# Patient Record
Sex: FEMALE | URBAN - METROPOLITAN AREA
[De-identification: names, ages, dates, MRNs, and addresses within clinical notes are randomized per-mention and may not be internally consistent; named-entity substitution may affect disease eponyms.]

---

## 2017-02-02 ENCOUNTER — OFFICE VISIT (OUTPATIENT)
Dept: FAMILY MEDICINE | Facility: CLINIC | Age: 27
End: 2017-02-02

## 2017-02-02 VITALS
SYSTOLIC BLOOD PRESSURE: 113 MMHG | HEIGHT: 64 IN | BODY MASS INDEX: 39.67 KG/M2 | OXYGEN SATURATION: 97 % | WEIGHT: 232.38 LBS | TEMPERATURE: 98.3 F | HEART RATE: 81 BPM | DIASTOLIC BLOOD PRESSURE: 76 MMHG

## 2017-02-02 DIAGNOSIS — R07.0 THROAT PAIN: Primary | ICD-10-CM

## 2017-02-02 LAB — S PYO AG THROAT QL IA.RAPID: NEGATIVE

## 2017-02-02 NOTE — Clinical Note
February 7, 2017        Abida Fam  [address] [place] [place] [address]        Dear Abida,    The confirmatory test for strep was negative which means you do not have a strep infection that needs antibiotics. If you are not getting better or have any increased symptoms, you can return to clinic to have retesting done, otherwise, your throat pain will likely go away with supportive cares for sore throat. Please call the clinic if you have any questions.     Please see below for your test results.    Resulted Orders   Rapid Strep Screen (Group) ([facility])   Result Value Ref Range    Rapid Strep A Screen NEGATIVE Negative   Group A Strep Throat (Health system)   Result Value Ref Range    Group A Strep,Throat No Group A Strep rRNA detected No Group A Strep rRNA detected    Narrative    Test performed by:  [facility] [address] [place] [address]  Intended Use:  The GEN-PROBE Group A Streptococcus direct test is a DNA probe assay which   uses nucleic acid hybridization for the qualitative detection of Group A   Streptococcal RNA to aid in the diagnosis of Group A Streptococcal pharyngitis   from throat swabs.  Methodology:  The GEN-PROBE DNA probe assay uses a single-stranded DNA probe with a   chemiluminescent label, which is complementary to the ribosomal RNA of the   target organism.  The labeled DNA probe combines with the ribosomal RNA to   form a stable DNA:RNA hybrid.  The labeled DNA:RNA hybrids are measured in   GEN-PROBE luminometer.  A positive result is a luminometer reading greater   than or equal to the cut-off.  A value below this cut-off is a negative   result.     If you have any questions, please call the clinic to make an appointment.    Sincerely,  Anai Diallo MD

## 2017-02-02 NOTE — PROGRESS NOTES
"       SUBJECTIVE       Abida Fam is a 26 year old  female with a PMH significant for:     Patient Active Problem List   Diagnosis     Abnormal pap smear of cervix     Acne     Health Care Home     Morbid obesity (H)     Vitamin D deficiency     Postpartum hemorrhage, delivered     She presents for 1 day history of sore throat and diarrhea and is concerned she might have strep infection. Her daughter was diagnosed with strep pharyngitis last night at the hospital with positive strep culture and has started treatment. Her other 2 daughters have not had symptoms. Sore throat started yesterday evening with pain and mild difficulty swallowing. She woke up this morning with more pain and \"closed feeling\" in her throat. She has not had difficulty breathing. She has not noticed swelling on the outside of her neck. She felt a little warm last night when she got back from the hospital at 1 AM, but did not take her temperature and is unsure if she has had true fevers. She has not had sinus pressure or rhinorrhea, no changes in hearing or pain in ears. No cough, no headaches or chest pain. Has not had abdominal pain but had a few episodes of diarrhea since last night and this morning. Has not recently had strep infection and not recently taking antibiotics. Denies allergies to medications and does not take any medications on a regular basis.     PMH, Medications and Allergies were reviewed and updated as needed.          OBJECTIVE     Filed Vitals:    02/02/17 0835   BP: 113/76   Pulse: 81   Temp: 98.3  F (36.8  C)   TempSrc: Oral   Height: 5' 3.75\" (161.9 cm)   Weight: 232 lb 6 oz (105.405 kg)   SpO2: 97%     Body mass index is 40.21 kg/(m^2).    General: Calm appearing female in no apparent distress, obese  HEENT: normocephalic, atraumatic, PERRL, sclera anicteric and not injected. Tonsils pink and 1+ without exudate or erythema. No cervical lymphadenopathy.   Cardio: RRR, no murmurs, rubs or gallop  Respiratory: Clear " breath sounds bilaterally without wheezes or rhonchi  Abdomen: Soft, non-tender    Results for orders placed or performed in visit on 02/02/17 (from the past 24 hour(s))   Rapid Strep Screen (Group) ([facility])   Result Value Ref Range    Rapid Strep A Screen NEGATIVE Negative       ASSESSMENT AND PLAN     1. Throat pain: Exposure to strep infection in her daughter this week. Negative strep test today and mild symptoms. Exam findings not significant for tonsillar swelling or exudates. Recommend supportive cares with tylenol and/or ibuprofen prn for pain. Discussed treatment options if confirmatory test positive, will sent penicillin 500 mg BID x10 days to her pharmacy.   - Rapid Strep Screen (Group) ([facility])      Anai Diallo MD - PGY-3  [facility] Medicine Residency    Patient seen and plan of care discussed with preceptor, Dr. Gomez

## 2017-02-02 NOTE — PROGRESS NOTES
Preceptor attestation:  Patient seen and discussed with the resident.  Assessment and plan reviewed with resident and agreed upon.  Supervising physician: Jennifer Gomez  [facility]

## 2017-02-03 LAB — GROUP A STREP,THROAT: NORMAL

## 2017-02-28 ENCOUNTER — OFFICE VISIT (OUTPATIENT)
Dept: FAMILY MEDICINE | Facility: CLINIC | Age: 27
End: 2017-02-28

## 2017-02-28 VITALS
HEART RATE: 90 BPM | SYSTOLIC BLOOD PRESSURE: 131 MMHG | HEIGHT: 63 IN | BODY MASS INDEX: 41.89 KG/M2 | TEMPERATURE: 97.9 F | DIASTOLIC BLOOD PRESSURE: 81 MMHG | WEIGHT: 236.4 LBS

## 2017-02-28 DIAGNOSIS — Z30.9 ENCOUNTER FOR CONTRACEPTIVE MANAGEMENT, UNSPECIFIED CONTRACEPTIVE ENCOUNTER TYPE: Primary | ICD-10-CM

## 2017-02-28 LAB — HCG UR QL: NEGATIVE

## 2017-02-28 NOTE — MR AVS SNAPSHOT
"              After Visit Summary   2017    Abida Fam    MRN: [id number]           Patient Information     Date Of Birth          1990        Visit Information        Provider Department      2017 3:30 PM Vira Britt MD [facility]        Today's Diagnoses     Encounter for contraceptive management, unspecified contraceptive encounter type    -  1       Follow-ups after your visit        Who to contact     Please call your clinic at [phone] to:    Ask questions about your health    Make or cancel appointments    Discuss your medicines    Learn about your test results    Speak to your doctor   If you have compliments or concerns about an experience at your clinic, or if you wish to file a complaint, please contact [facility] Patient Relations at [phone] or email us at [email or web address]         Additional Information About Your Visit        MyChart Information     PowerFile is an electronic gateway that provides easy, online access to your medical records. With PowerFile, you can request a clinic appointment, read your test results, renew a prescription or communicate with your care team.     To sign up for Jumbletst visit the website at [email or web address]   You will be asked to enter the access code listed below, as well as some personal information. Please follow the directions to create your username and password.     Your access code is: [email or web address]  Expires: 2017  3:35 PM     Your access code will  in 90 days. If you need help or a new code, please contact your [facility] or call [phone] for assistance.        Care EveryWhere ID     This is your Care EveryWhere ID. This could be used by other organizations to access your [place] medical records  [id number]        Your Vitals Were     Pulse Temperature Height Last Period BMI (Body Mass Index)       90 97.9  F (36.6  C) (Oral) 5' 3.39\" (161 " cm) 02/16/2017 41.37 kg/m2        Blood Pressure from Last 3 Encounters:   02/28/17 131/81   02/02/17 113/76   07/28/16 105/66    Weight from Last 3 Encounters:   02/28/17 236 lb 6.4 oz (107.2 kg)   02/02/17 232 lb 6 oz (105.4 kg)   07/28/16 240 lb 6.4 oz (109 kg)              We Performed the Following     HCG Qualitative Urine (UPT)  ([facility])        Primary Care Provider Office Phone # Fax #    Vira Britt [phone]       [address] [address]        Thank you!     Thank you for choosing [facility]  for your care. Our goal is always to provide you with excellent care. Hearing back from our patients is one way we can continue to improve our services. Please take a few minutes to complete the written survey that you may receive in the mail after your visit with us. Thank you!             Your Updated Medication List - Protect others around you: Learn how to safely use, store and throw away your medicines at www.disposemymeds.org.      Notice  As of 2/28/2017  3:35 PM    You have not been prescribed any medications.

## 2017-02-28 NOTE — PROGRESS NOTES
"       SUBJECTIVE       Abida Fam is a 26 year old  female with a PMH significant for:     Patient Active Problem List   Diagnosis     Abnormal pap smear of cervix     Acne     Health Care Home     Morbid obesity (H)     Vitamin D deficiency     Postpartum hemorrhage, delivered     She presents with birth control.  She has not taken the OCPs in 3 months.  Her LMP was Feb 16th - it finished one week ago.  Her last intercourse was one week ago.  She has reliably used condoms.  She has had a mirena in the past but believes it makes her gain weight and song.  For this reason she is interested in a 10 year non hormone IUD - copper.  Discussed risks and benefits and concerns for heavy bleeding and spotting.  She is okay with these.     Pregnancy test today was negative.       PMH, Medications and Allergies were reviewed and updated as needed.        REVIEW OF SYSTEMS     Pertinent review, per HPI.        OBJECTIVE     Vitals:    02/28/17 1512   BP: 131/81   BP Location: Left arm   Patient Position: Chair   Pulse: 90   Temp: 97.9  F (36.6  C)   TempSrc: Oral   Weight: 236 lb 6.4 oz (107.2 kg)   Height: 5' 3.39\" (161 cm)     Body mass index is 41.37 kg/(m^2).    Constitutional: Well appearing, no acute distress.  No respiratory distress  Abdomen: Morbidly obese.     Results for orders placed or performed in visit on 02/28/17 (from the past 24 hour(s))   HCG Qualitative Urine (UPT)  ([facility])   Result Value Ref Range    HCG Qual Urine NEGATIVE Negative           ASSESSMENT AND PLAN     (Z30.9) Encounter for contraceptive management, unspecified contraceptive encounter type  (primary encounter diagnosis)  Comment: Risks and benefits of IUD discussed, wishes to proceed with copper IUD for hormone avoidance.  She was instructed to RTC in two weeks and use condoms reliably, which she agrees to.    Plan: HCG Qualitative Urine (UPT) NEGATIVE    RTC 2wks or sooner if develops new or worsening symptoms.      Vira Britt MD  PGY-3 " [facility]

## 2017-02-28 NOTE — PROGRESS NOTES
Preceptor attestation:  Patient seen and discussed with the resident. Assessment and plan reviewed with resident and agreed upon.  Supervising physician: Flex Guardado  [facility]

## 2017-03-30 ENCOUNTER — OFFICE VISIT (OUTPATIENT)
Dept: FAMILY MEDICINE | Facility: CLINIC | Age: 27
End: 2017-03-30

## 2017-03-30 VITALS
DIASTOLIC BLOOD PRESSURE: 79 MMHG | HEART RATE: 80 BPM | OXYGEN SATURATION: 98 % | TEMPERATURE: 98.6 F | WEIGHT: 238 LBS | HEIGHT: 65 IN | SYSTOLIC BLOOD PRESSURE: 116 MMHG | BODY MASS INDEX: 39.65 KG/M2

## 2017-03-30 DIAGNOSIS — Z30.430 ENCOUNTER FOR INSERTION OF INTRAUTERINE CONTRACEPTIVE DEVICE: ICD-10-CM

## 2017-03-30 DIAGNOSIS — Z30.014 ENCOUNTER FOR INITIAL PRESCRIPTION OF INTRAUTERINE CONTRACEPTIVE DEVICE: Primary | ICD-10-CM

## 2017-03-30 LAB — HCG UR QL: NEGATIVE

## 2017-03-30 RX ORDER — COPPER 313.4 MG/1
1 INTRAUTERINE DEVICE INTRAUTERINE CONTINUOUS
Start: 2017-03-30

## 2017-03-30 NOTE — MR AVS SNAPSHOT
After Visit Summary   3/30/2017    Abida Fam    MRN: [id number]           Patient Information     Date Of Birth          1990        Visit Information        Provider Department      3/30/2017 1:10 PM Vira Britt MD [facility]        Today's Diagnoses     Encounter for initial prescription of intrauterine contraceptive device    -  1    Encounter for insertion of intrauterine contraceptive device           Follow-ups after your visit        Who to contact     Please call your clinic at [phone] to:    Ask questions about your health    Make or cancel appointments    Discuss your medicines    Learn about your test results    Speak to your doctor   If you have compliments or concerns about an experience at your clinic, or if you wish to file a complaint, please contact [facility] Patient Relations at [phone] or email us at [email or web address]         Additional Information About Your Visit        MyChart Information     Equip Outdoor Technologiest is an electronic gateway that provides easy, online access to your medical records. With iMedX, you can request a clinic appointment, read your test results, renew a prescription or communicate with your care team.     To sign up for Equip Outdoor Technologiest visit the website at [email or web address]   You will be asked to enter the access code listed below, as well as some personal information. Please follow the directions to create your username and password.     Your access code is: [email or web address]  Expires: 2017  4:35 PM     Your access code will  in 90 days. If you need help or a new code, please contact your [facility] or call [phone] for assistance.        Care EveryWhere ID     This is your Care EveryWhere ID. This could be used by other organizations to access your [place] medical records  [id number]        Your Vitals Were     Pulse Temperature Height Last Period Pulse  "Oximetry Breastfeeding?    80 98.6  F (37  C) 5' 4.5\" (163.8 cm) 03/17/2017 (Exact Date) 98% No    BMI (Body Mass Index)                   40.22 kg/m2            Blood Pressure from Last 3 Encounters:   03/30/17 116/79   02/28/17 131/81   02/02/17 113/76    Weight from Last 3 Encounters:   03/30/17 238 lb (108 kg)   02/28/17 236 lb 6.4 oz (107.2 kg)   02/02/17 232 lb 6 oz (105.4 kg)              We Performed the Following     HCG Qualitative Urine ([facility])     INSERTION INTRAUTERINE DEVICE     INTRAUT COPPER CONTRACEPTIVE          Today's Medication Changes          These changes are accurate as of: 3/30/17 11:59 PM.  If you have any questions, ask your nurse or doctor.               Start taking these medicines.        Dose/Directions    paragard intrauterine copper   Used for:  Encounter for initial prescription of intrauterine contraceptive device   Started by:  Vira Britt MD        Dose:  1 each   1 each by Intrauterine route continuous   Refills:  0            Where to get your medicines      Some of these will need a paper prescription and others can be bought over the counter.  Ask your nurse if you have questions.     You don't need a prescription for these medications     paragard intrauterine copper                Primary Care Provider Office Phone # Fax #    Vira Britt [phone]       [address] [address]        Thank you!     Thank you for choosing [facility]  for your care. Our goal is always to provide you with excellent care. Hearing back from our patients is one way we can continue to improve our services. Please take a few minutes to complete the written survey that you may receive in the mail after your visit with us. Thank you!             Your Updated Medication List - Protect others around you: Learn how to safely use, store and throw away your medicines at www.disposemymeds.org.          This list is accurate as of: 3/30/17 11:59 PM.  Always " use your most recent med list.                   Brand Name Dispense Instructions for use    paragard intrauterine copper      1 each by Intrauterine route continuous

## 2017-03-30 NOTE — PROGRESS NOTES
IUD Insertion Note    Abida Fam is a patient of Vira Sood here for IUD placement.   Indication: birth control     Counselling: Discussed potential side effects of Paragard, including increased bleeding and cramping.  Patient aware to check for strings every month.    Consent: Affirmation of informed consent was signed and scanned into the medical record. Risks, benefits and alternatives were discussed including small risk of uterine perforation during insertion. Patient's questions were elicited and answered.   Procedure safety checklist was completed:  Yes  Time Out (Pause for the Cause) completed: Yes    Labs: UPT negative    Patient chooses this IUD type: ParaGard    Technique:   Patient was premedicated with ibuprofen:   Yes  Uterine position was confirmed by bimanual exam:  YES  Using a sterile speculum and equipment, the cervix  was examined.   A GC-Chlamydia probe was collected:   No   The cervix was cleansed with Betadine prep. Tenaculum was applied to cervix with tension to straighten cervical canal. The uterus was sounded to 7.5 cm. The ParaGard insertion apparatus was prepared and introduced into the cervix without significant resistance.  The IUD was placed in the uterus. The strings were trimmed 2 cm below the cervix.   IUD Lot #: [id number]  EBL: minimal  Complications: No  Tolerance: Pt tolerated procedure well and was in stable condition.     Follow up: Pt was instructed to call if heavy bleeding, severe cramping or foul smelling discharge. May take 400-600 mg ibuprofen TID prn for mild cramping. Pt should return in one month for IUD string check. Pt instructed she requires a new IUD device in 10 years.     Resident: Vira Britt  Faculty: Celos present for and supervised this entire procedure.

## 2017-03-31 NOTE — PROGRESS NOTES
"Preceptor attestation:  Vital signs reviewed: /79  Pulse 80  Temp 98.6  F (37  C)  Ht 5' 4.5\" (163.8 cm)  Wt 238 lb (108 kg)  LMP 03/17/2017 (Exact Date)  SpO2 98%  Breastfeeding? No  BMI 40.22 kg/m2    Patient seen and discussed with the resident. Assessment and plan reviewed with resident and agreed upon.    I was present for and supervised the entire IUD insertion.    Supervising physician: Rocio Houston MD  [facility]  "

## 2018-04-20 ENCOUNTER — OFFICE VISIT (OUTPATIENT)
Dept: FAMILY MEDICINE | Facility: CLINIC | Age: 28
End: 2018-04-20
Payer: COMMERCIAL

## 2018-04-20 VITALS
BODY MASS INDEX: 39.64 KG/M2 | HEART RATE: 80 BPM | SYSTOLIC BLOOD PRESSURE: 117 MMHG | RESPIRATION RATE: 16 BRPM | WEIGHT: 232.2 LBS | DIASTOLIC BLOOD PRESSURE: 76 MMHG | HEIGHT: 64 IN | OXYGEN SATURATION: 98 % | TEMPERATURE: 98.1 F

## 2018-04-20 DIAGNOSIS — Z30.431 CHECKING OF INTRAUTERINE DEVICE: Primary | ICD-10-CM

## 2018-04-20 DIAGNOSIS — L73.9 FOLLICULITIS: ICD-10-CM

## 2018-04-20 NOTE — PROGRESS NOTES
"Subjective:    Abida Fam is a 28 year old female who presents for PAP smear, check of her IUD strings and evaluation of bumps in her vaginal area. She tells me that she thinks she needs a PAP smear because it has been a few years. She denies any history of abnormal PAP smears, but I do see that she had LGSIL in 2009. However, since that time she has had negative co-testing and four negative PAP smears, with her most recent 7/2016.     She would also like us to check her IUD strings because she was told it could adhere to her uterus and that worries her. She says she is still getting a monthly period that lasts a week and also gets some spotting twice/week with some cramping. This spotting is not very bothersome to her and she is tolerating the IUD reasonably well.    Lastly, she would like me to take a look at her external vaginal area because she feels like there are some bumps on her mons pubis and just lateral to her vaginal opening. These having been present for a while. The bumps on her mons pubis come and go and occasionally get inflamed. She has been told in the past to wear cotton underwear to help with these and she has made this change. She shaves once/month and it does seem to be worse at that time.    ROS:   General: Denies fevers, chills.  Skin: Denies new rashes or lesions.  GI: Denies N/V/D or abdominal pain.  Genital: +vaginal spotting, bumps vaginal area.    Objective:    /76  Pulse 80  Temp 98.1  F (36.7  C) (Oral)  Resp 16  Ht 5' 3.5\" (161.3 cm)  Wt 232 lb 3.2 oz (105.3 kg)  SpO2 98%  BMI 40.48 kg/m2    Physical Exam:  General: NAD.  HEENT: PERRLA, EOMI.   Heart: Regular rhythm, no murmurs.  Lungs: Clear to auscultation b/l, no wheezes, crackles, or rales.  Breasts: No masses, dimpling, nipple discharge, or skin changes.  Abdomen: No distension.  : Cervix and vaginal walls normal in appearance. IUD string seen at 6 o'clock location of cervical opening. Skin tag seen just lateral " to the left labia majora. 2-3 small pustules seen over mons pubis at base of hair.      Assessment/Plan:    IUD check: Strings visualized. Spotting is likely due to irritant effect of IUD, but does not seem bothersome to patient so will just treat symptomatically at this time. Return if symptoms worsening. Advised continued periodic skin checks at home.     Folliculitis, skin tag: Folliculitis likely due to shaving and moisture in this area. Advised a hiatus from shaving and making sure to keep this area clean and dry. If symptoms are worsening we could try a topical clindamycin ointment. Skin tag does not appear to be bothering patient other then to know if isn't anything abnormal. If this becomes bothersome, I did offer to remove it here in clinic or refer her to OB/GYN if she is more comfortable with that.    Healthcare Maintenance: Not due for PAP today, return in 1 year for next PAP smear. Discussed new testing guidelines with patient.    Tereza Araujo, PGY 3  Family Medicine Resident  [facility]

## 2018-04-20 NOTE — PROGRESS NOTES
Preceptor Attestation:   Patient seen, evaluated and discussed with the resident.  I have verified the content of the note, which accurately reflects my assessment of the patient and the plan of care.   Supervising Physician:  Jennifer Gomez MD

## 2018-04-20 NOTE — MR AVS SNAPSHOT
After Visit Summary   2018    Abida Fam    MRN: [id number]           Patient Information     Date Of Birth          1990        Visit Information        Provider Department      2018 8:00 AM Tereza Araujo DO [facility]        Care Instructions    IUD appears to be in a normal place. This will last ten years. It is a good idea to check the strings periodically.     If the skin tag is bothering you, we can refer you to get this removed or try to do this here.    The skin above your vaginal area appears to be getting pimples, this can be improved by keeping the area clean and dry and wearing breathable underwear.    Please return in 1 year for your next PAP smear.             Follow-ups after your visit        Who to contact     Please call your clinic at [phone] to:    Ask questions about your health    Make or cancel appointments    Discuss your medicines    Learn about your test results    Speak to your doctor            Additional Information About Your Visit        MyChart Information     CoMentis is an electronic gateway that provides easy, online access to your medical records. With CoMentis, you can request a clinic appointment, read your test results, renew a prescription or communicate with your care team.     To sign up for Concur Japant visit the website at [email or web address]   You will be asked to enter the access code listed below, as well as some personal information. Please follow the directions to create your username and password.     Your access code is: [email or web address]  Expires: 2018  8:24 AM     Your access code will  in 90 days. If you need help or a new code, please contact your [facility] or call [phone] for assistance.        Care EveryWhere ID     This is your Care EveryWhere ID. This could be used by other organizations to access your [place] medical records  [id number]        Your Vitals Were  "    Pulse Temperature Respirations Height Pulse Oximetry BMI (Body Mass Index)    80 98.1  F (36.7  C) (Oral) 16 5' 3.5\" (161.3 cm) 98% 40.48 kg/m2       Blood Pressure from Last 3 Encounters:   04/20/18 117/76   03/30/17 116/79   02/28/17 131/81    Weight from Last 3 Encounters:   04/20/18 232 lb 3.2 oz (105.3 kg)   03/30/17 238 lb (108 kg)   02/28/17 236 lb 6.4 oz (107.2 kg)              Today, you had the following     No orders found for display       Primary Care Provider Office Phone # Fax #    Tamy GuillenDO [phone]       [address] [place] [address]        Equal Access to Services     RAY GARCIA : Lenora galarza Soangel, waaxda luqadaha, qaybta kaalmada adeegyada, johanna matute . So [facility] [phone].    ATENCIÓN: Si habla español, tiene a lange disposición servicios gratuitos de asistencia lingüística. Llame al [phone].    We comply with applicable federal civil rights laws and [place] laws. We do not discriminate on the basis of race, color, national origin, age, disability, sex, sexual orientation, or gender identity.            Thank you!     Thank you for choosing [facility]  for your care. Our goal is always to provide you with excellent care. Hearing back from our patients is one way we can continue to improve our services. Please take a few minutes to complete the written survey that you may receive in the mail after your visit with us. Thank you!             Your Updated Medication List - Protect others around you: Learn how to safely use, store and throw away your medicines at www.disposemymeds.org.          This list is accurate as of 4/20/18  8:24 AM.  Always use your most recent med list.                   Brand Name Dispense Instructions for use Diagnosis    paragard intrauterine copper      1 each by Intrauterine route continuous    Encounter for initial prescription of intrauterine contraceptive device         "

## 2018-04-20 NOTE — PATIENT INSTRUCTIONS
IUD appears to be in a normal place. This will last ten years. It is a good idea to check the strings periodically.     If the skin tag is bothering you, we can refer you to get this removed or try to do this here.    The skin above your vaginal area appears to be getting pimples, this can be improved by keeping the area clean and dry and wearing breathable underwear.    Please return in 1 year for your next PAP smear.

## 2018-07-10 ENCOUNTER — TELEPHONE (OUTPATIENT)
Dept: FAMILY MEDICINE | Facility: CLINIC | Age: 28
End: 2018-07-10

## 2018-07-10 NOTE — TELEPHONE ENCOUNTER
Pt has an appt w/ Dr. Grimm this Thursday to discuss changes in her period. She's had an IUD for 2 years now. She usually gets a period the beginning of the month, but for the last 4 days she has been spotting. Denies abd pain, fever, chills. Advised to discuss this w/ MD in clinic.     Routed to Dr. Grimm. /CHERYL Lucas

## 2018-07-10 NOTE — TELEPHONE ENCOUNTER
[facility] phone call message- general phone call:    Reason for call: Pt states that she should have gotten her period this month, and she's always had a regular menstrual cycle.  Pt says she has been having cramping since Friday and has been just brownish.     Return call needed: Yes    OK to leave a message on voice mail? Yes    Primary language: English      needed? No    Call taken on July 10, 2018 at 11:59 AM by Grisel Flores-Cardona

## 2019-04-23 ENCOUNTER — TELEPHONE (OUTPATIENT)
Dept: FAMILY MEDICINE | Facility: CLINIC | Age: 29
End: 2019-04-23

## 2019-04-23 NOTE — TELEPHONE ENCOUNTER
[facility] phone call message-patient reporting a symptom:     Symptom:     Pt fell and the edge of the window. The back of her head now hurts and is painful      Additional comments: None    OK to leave message on voice mail? Yes    Primary language: English      needed? No    Call taken on April 23, 2019 at 2:45 PM by Nuvia Morales

## 2019-04-23 NOTE — TELEPHONE ENCOUNTER
Pt fell on her left side of her head onto a window sill on Sat. She tripped over a couch. Pt states she was not unconscious after her fall. No bleeding. No swelling. Pt denies HA, dizziness, nausea/vomiting. Today pt states her vision is blurry-intermittent and HAs are intermittent as well. Advised to be seen by MD today. Pt verbalizes understanding of the directions and information. Gave pt opportunity to ask additional questions or address concerns.    Routed to Dr. Guillen. /CHERYL Lucas

## 2020-04-15 ENCOUNTER — TELEPHONE (OUTPATIENT)
Dept: FAMILY MEDICINE | Facility: CLINIC | Age: 30
End: 2020-04-15

## 2020-04-15 NOTE — TELEPHONE ENCOUNTER
[facility] phone call message- general phone call:    Reason for call: she has the 10yr iud but she wants to know which brand she is feeling some discomfort.    Action desired: call back.    Return call needed: Yes    OK to leave a message on voice mail? Yes    Advised patient to response may take up to 2 business days: Yes    Primary language: English      needed? No    Call taken on April 15, 2020 at 3:46 PM by Eze Churchill

## 2020-04-16 NOTE — TELEPHONE ENCOUNTER
Patient informed of brand of IUD  paragard intrauterine copper    Patient will be following up regarding this issue at another clinic due to lack of insurance.     btrn